# Patient Record
Sex: FEMALE | URBAN - METROPOLITAN AREA
[De-identification: names, ages, dates, MRNs, and addresses within clinical notes are randomized per-mention and may not be internally consistent; named-entity substitution may affect disease eponyms.]

---

## 2019-05-30 ENCOUNTER — HOSPITAL ENCOUNTER (OUTPATIENT)
Dept: RADIOLOGY | Facility: MEDICAL CENTER | Age: 44
End: 2019-05-30
Attending: OBSTETRICS & GYNECOLOGY
Payer: COMMERCIAL

## 2019-05-30 DIAGNOSIS — Z12.31 BREAST CANCER SCREENING BY MAMMOGRAM: ICD-10-CM

## 2019-05-30 PROCEDURE — 77063 BREAST TOMOSYNTHESIS BI: CPT

## 2020-02-24 ENCOUNTER — HOSPITAL ENCOUNTER (OUTPATIENT)
Dept: LAB | Facility: MEDICAL CENTER | Age: 45
End: 2020-02-24
Attending: OPHTHALMOLOGY
Payer: COMMERCIAL

## 2020-02-24 ENCOUNTER — HOSPITAL ENCOUNTER (OUTPATIENT)
Dept: RADIOLOGY | Facility: MEDICAL CENTER | Age: 45
End: 2020-02-24
Attending: OPHTHALMOLOGY
Payer: COMMERCIAL

## 2020-02-24 DIAGNOSIS — H30.91 RIGHT POSTERIOR UVEITIS: ICD-10-CM

## 2020-02-24 LAB
ALBUMIN SERPL BCP-MCNC: 4.8 G/DL (ref 3.2–4.9)
ALBUMIN/GLOB SERPL: 1.3 G/DL
ALP SERPL-CCNC: 56 U/L (ref 30–99)
ALT SERPL-CCNC: 11 U/L (ref 2–50)
ANION GAP SERPL CALC-SCNC: 10 MMOL/L (ref 0–11.9)
AST SERPL-CCNC: 19 U/L (ref 12–45)
BASOPHILS # BLD AUTO: 0.4 % (ref 0–1.8)
BASOPHILS # BLD: 0.03 K/UL (ref 0–0.12)
BILIRUB SERPL-MCNC: 0.3 MG/DL (ref 0.1–1.5)
BUN SERPL-MCNC: 11 MG/DL (ref 8–22)
CALCIUM SERPL-MCNC: 10 MG/DL (ref 8.5–10.5)
CHLORIDE SERPL-SCNC: 102 MMOL/L (ref 96–112)
CO2 SERPL-SCNC: 26 MMOL/L (ref 20–33)
CREAT SERPL-MCNC: 0.9 MG/DL (ref 0.5–1.4)
EOSINOPHIL # BLD AUTO: 0.07 K/UL (ref 0–0.51)
EOSINOPHIL NFR BLD: 0.9 % (ref 0–6.9)
ERYTHROCYTE [DISTWIDTH] IN BLOOD BY AUTOMATED COUNT: 39.1 FL (ref 35.9–50)
GLOBULIN SER CALC-MCNC: 3.6 G/DL (ref 1.9–3.5)
GLUCOSE SERPL-MCNC: 108 MG/DL (ref 65–99)
HCT VFR BLD AUTO: 41.7 % (ref 37–47)
HGB BLD-MCNC: 13.6 G/DL (ref 12–16)
IMM GRANULOCYTES # BLD AUTO: 0.02 K/UL (ref 0–0.11)
IMM GRANULOCYTES NFR BLD AUTO: 0.3 % (ref 0–0.9)
LYMPHOCYTES # BLD AUTO: 1.38 K/UL (ref 1–4.8)
LYMPHOCYTES NFR BLD: 18.6 % (ref 22–41)
MCH RBC QN AUTO: 29.5 PG (ref 27–33)
MCHC RBC AUTO-ENTMCNC: 32.6 G/DL (ref 33.6–35)
MCV RBC AUTO: 90.5 FL (ref 81.4–97.8)
MONOCYTES # BLD AUTO: 0.61 K/UL (ref 0–0.85)
MONOCYTES NFR BLD AUTO: 8.2 % (ref 0–13.4)
NEUTROPHILS # BLD AUTO: 5.29 K/UL (ref 2–7.15)
NEUTROPHILS NFR BLD: 71.6 % (ref 44–72)
NRBC # BLD AUTO: 0 K/UL
NRBC BLD-RTO: 0 /100 WBC
PLATELET # BLD AUTO: 284 K/UL (ref 164–446)
PMV BLD AUTO: 9.9 FL (ref 9–12.9)
POTASSIUM SERPL-SCNC: 3.5 MMOL/L (ref 3.6–5.5)
PROT SERPL-MCNC: 8.4 G/DL (ref 6–8.2)
RBC # BLD AUTO: 4.61 M/UL (ref 4.2–5.4)
SODIUM SERPL-SCNC: 138 MMOL/L (ref 135–145)
WBC # BLD AUTO: 7.4 K/UL (ref 4.8–10.8)

## 2020-02-24 PROCEDURE — 71046 X-RAY EXAM CHEST 2 VIEWS: CPT

## 2020-02-24 PROCEDURE — 80053 COMPREHEN METABOLIC PANEL: CPT

## 2020-02-24 PROCEDURE — 86777 TOXOPLASMA ANTIBODY: CPT

## 2020-02-24 PROCEDURE — 36415 COLL VENOUS BLD VENIPUNCTURE: CPT

## 2020-02-24 PROCEDURE — 86480 TB TEST CELL IMMUN MEASURE: CPT

## 2020-02-24 PROCEDURE — 85025 COMPLETE CBC W/AUTO DIFF WBC: CPT

## 2020-02-24 PROCEDURE — 86780 TREPONEMA PALLIDUM: CPT

## 2020-02-24 PROCEDURE — 85549 MURAMIDASE: CPT

## 2020-02-24 PROCEDURE — 82164 ANGIOTENSIN I ENZYME TEST: CPT

## 2020-02-25 LAB — TREPONEMA PALLIDUM IGG+IGM AB [PRESENCE] IN SERUM OR PLASMA BY IMMUNOASSAY: NON REACTIVE

## 2020-02-26 LAB
ACE SERPL-CCNC: 64 U/L (ref 9–67)
GAMMA INTERFERON BACKGROUND BLD IA-ACNC: 0.09 IU/ML
LYSOZYME SERPL-MCNC: 1.33 UG/ML
M TB IFN-G BLD-IMP: NEGATIVE
M TB IFN-G CD4+ BCKGRND COR BLD-ACNC: 0.01 IU/ML
MITOGEN IGNF BCKGRD COR BLD-ACNC: >10 IU/ML
QFT TB2 - NIL TBQ2: 0.01 IU/ML
T GONDII IGG SER-ACNC: <3 IU/ML

## 2020-03-09 ENCOUNTER — OFFICE VISIT (OUTPATIENT)
Dept: MEDICAL GROUP | Facility: MEDICAL CENTER | Age: 45
End: 2020-03-09
Payer: COMMERCIAL

## 2020-03-09 VITALS
WEIGHT: 156 LBS | SYSTOLIC BLOOD PRESSURE: 106 MMHG | HEIGHT: 67 IN | RESPIRATION RATE: 16 BRPM | OXYGEN SATURATION: 100 % | TEMPERATURE: 98.3 F | BODY MASS INDEX: 24.48 KG/M2 | HEART RATE: 64 BPM | DIASTOLIC BLOOD PRESSURE: 68 MMHG

## 2020-03-09 DIAGNOSIS — R59.0 HILAR ADENOPATHY: ICD-10-CM

## 2020-03-09 DIAGNOSIS — H30.93 POSTERIOR UVEITIS, BILATERAL: ICD-10-CM

## 2020-03-09 DIAGNOSIS — Z13.6 SCREENING FOR CARDIOVASCULAR CONDITION: ICD-10-CM

## 2020-03-09 DIAGNOSIS — R77.8 ELEVATED TOTAL PROTEIN: ICD-10-CM

## 2020-03-09 PROBLEM — Z97.5 IUD (INTRAUTERINE DEVICE) IN PLACE: Status: ACTIVE | Noted: 2020-03-09

## 2020-03-09 PROCEDURE — 99203 OFFICE O/P NEW LOW 30 MIN: CPT | Performed by: FAMILY MEDICINE

## 2020-03-09 RX ORDER — CYCLOPENTOLATE HYDROCHLORIDE 10 MG/ML
SOLUTION/ DROPS OPHTHALMIC
COMMUNITY
Start: 2020-02-24

## 2020-03-09 RX ORDER — PREDNISOLONE ACETATE 10 MG/ML
SUSPENSION/ DROPS OPHTHALMIC
COMMUNITY
Start: 2020-02-22

## 2020-03-09 SDOH — HEALTH STABILITY: MENTAL HEALTH: HOW OFTEN DO YOU HAVE A DRINK CONTAINING ALCOHOL?: NEVER

## 2020-03-09 ASSESSMENT — FIBROSIS 4 INDEX: FIB4 SCORE: 0.89

## 2020-03-09 NOTE — PROGRESS NOTES
"cc:  adenopathy    Subjective:     Vesta Smith is a 44 y.o. female presenting with her  to [facility] care.  Several weeks ago, she developed floaters in her right eye.  She saw ophthalmology, who diagnosed her with posterior uveitis bilaterally.  She is currently on 2 eyedrops.  She had a chest x-ray that showed bilateral hilar adenopathy.  QuantiFERON gold, toxo, ACE, syphilis, CBC were normal.  CMP was notable only for mildly elevated total protein and globulin level.  She reports that her vision has stabilized.  Denies any other symptoms.  Denies any fevers, cough, chest pain, shortness of breath, lightheadedness, dizziness, skin lesions weight changes.  Is a non-smoker.  She eats healthy, is vegetarian.  Exercises regularly.  They do travel a fair amount.  She is originally from [place].  She reports that her mom did have cutaneous tuberculosis.      Review of systems:  See above.       Current Outpatient Medications:   •  cyclopentolate (CYCLOGYL) 1 % Solution, INSTILL 1 DROP INTO RIGHT EYE 4 TIMES DAILY FOR 2 DAYS AND THEN EVERY 12 HOURS FOR 2 WEEKS, Disp: , Rfl:   •  prednisoLONE acetate (PRED FORTE) 1 % Suspension, , Disp: , Rfl:     Allergies, past medical history, past surgical history, family history, social history reviewed and updated    Objective:     Vitals: /68   Pulse 64   Temp 36.8 °C (98.3 °F)   Resp 16   Ht 1.702 m (5' 7\")   Wt 70.8 kg (156 lb)   SpO2 100%   BMI 24.43 kg/m²   General: Alert, pleasant, NAD  HEENT: Normocephalic.  EOMI, no icterus or pallor.  Conjunctivae and lids normal. External ears normal. Oropharynx non-erythematous, mucous membranes moist.  Neck supple.  No thyromegaly or masses palpated. No cervical or supraclavicular lymphadenopathy.  Heart: Regular rate and rhythm.  S1 and S2 normal.  No murmurs appreciated.  Respiratory: Normal respiratory effort.  Clear to auscultation bilaterally.  Abdomen: Non-distended, soft  Skin: Warm, dry, no " rashes.  Musculoskeletal: Gait is normal.  Moves all extremities well.  Extremities: No leg edema.  Psych:  Affect/mood is normal, judgement is good, memory is intact, grooming is appropriate.    Assessment/Plan:     Vesta was seen today for establish care.    Diagnoses and all orders for this visit:    Hilar adenopathy  Posterior uveitis, bilateral  New problems, discussed possible sarcoidosis.  Will check a CT of the chest, also check a TSH and SPEP.  They have a friend who is a pulmonologist who they plan to consult.  They declined a referral for now, but will let us know if it referral needs to be placed.  -     CT-CHEST (THORAX) WITH; Future  -     SPEP W/REFLEX TO TANO, A, G, M; Future  -     TSH WITH REFLEX TO FT4; Future    Elevated total protein  New problem, we reviewed her labs, chest x-ray.  We will check the following  -     CT-CHEST (THORAX) WITH; Future  -     SPEP W/REFLEX TO TANO, A, G, M; Future  -     TSH WITH REFLEX TO FT4; Future        Return in about 1 year (around 3/9/2021) for Preventive/annual physical.

## 2020-03-21 ENCOUNTER — APPOINTMENT (OUTPATIENT)
Dept: RADIOLOGY | Facility: MEDICAL CENTER | Age: 45
End: 2020-03-21
Attending: NURSE PRACTITIONER
Payer: COMMERCIAL

## 2020-03-21 ENCOUNTER — HOSPITAL ENCOUNTER (OUTPATIENT)
Dept: LAB | Facility: MEDICAL CENTER | Age: 45
End: 2020-03-21
Attending: FAMILY MEDICINE
Payer: COMMERCIAL

## 2020-03-21 ENCOUNTER — HOSPITAL ENCOUNTER (OUTPATIENT)
Dept: LAB | Facility: MEDICAL CENTER | Age: 45
End: 2020-03-21
Payer: COMMERCIAL

## 2020-03-21 DIAGNOSIS — R59.0 LYMPHADENOPATHY, THORACIC: ICD-10-CM

## 2020-03-21 DIAGNOSIS — R77.8 ELEVATED TOTAL PROTEIN: ICD-10-CM

## 2020-03-21 DIAGNOSIS — R59.0 HILAR ADENOPATHY: ICD-10-CM

## 2020-03-21 LAB
BDY FAT % MEASURED: 30.6 %
BP DIAS: 55 MMHG
BP SYS: 102 MMHG
CHOLEST SERPL-MCNC: 177 MG/DL (ref 100–199)
DIABETES HTDIA: NO
EVENT NAME HTEVT: NORMAL
FASTING HTFAS: YES
GLUCOSE SERPL-MCNC: 89 MG/DL (ref 65–99)
HDLC SERPL-MCNC: 61 MG/DL
HYPERTENSION HTHYP: NO
LDLC SERPL CALC-MCNC: 102 MG/DL
SCREENING LOC CITY HTCIT: NORMAL
SCREENING LOC STATE HTSTA: NORMAL
SCREENING LOCATION HTLOC: NORMAL
SMOKING HTSMO: NO
SUBSCRIBER ID HTSID: NORMAL
TRIGL SERPL-MCNC: 70 MG/DL (ref 0–149)
TSH SERPL DL<=0.005 MIU/L-ACNC: 1.71 UIU/ML (ref 0.38–5.33)

## 2020-03-21 PROCEDURE — 84155 ASSAY OF PROTEIN SERUM: CPT

## 2020-03-21 PROCEDURE — 82947 ASSAY GLUCOSE BLOOD QUANT: CPT

## 2020-03-21 PROCEDURE — 84165 PROTEIN E-PHORESIS SERUM: CPT

## 2020-03-21 PROCEDURE — 700117 HCHG RX CONTRAST REV CODE 255: Performed by: NURSE PRACTITIONER

## 2020-03-21 PROCEDURE — 71260 CT THORAX DX C+: CPT

## 2020-03-21 PROCEDURE — 84443 ASSAY THYROID STIM HORMONE: CPT

## 2020-03-21 PROCEDURE — 80061 LIPID PANEL: CPT

## 2020-03-21 PROCEDURE — 36415 COLL VENOUS BLD VENIPUNCTURE: CPT

## 2020-03-21 PROCEDURE — S5190 WELLNESS ASSESSMENT BY NONPH: HCPCS

## 2020-03-21 RX ADMIN — IOHEXOL 75 ML: 350 INJECTION, SOLUTION INTRAVENOUS at 12:30

## 2020-03-24 LAB
ALBUMIN SERPL ELPH-MCNC: 4.04 G/DL (ref 3.75–5.01)
ALPHA1 GLOB SERPL ELPH-MCNC: 0.23 G/DL (ref 0.19–0.46)
ALPHA2 GLOB SERPL ELPH-MCNC: 0.59 G/DL (ref 0.48–1.05)
B-GLOBULIN SERPL ELPH-MCNC: 0.83 G/DL (ref 0.48–1.1)
GAMMA GLOB SERPL ELPH-MCNC: 1.31 G/DL (ref 0.62–1.51)
INTERPRETATION SERPL IFE-IMP: NORMAL
MONOCLON BAND OBS SERPL: NORMAL
PATHOLOGY STUDY: NORMAL
PROT SERPL-MCNC: 7 G/DL (ref 6.3–8.2)

## 2020-07-13 ENCOUNTER — PATIENT MESSAGE (OUTPATIENT)
Dept: MEDICAL GROUP | Facility: MEDICAL CENTER | Age: 45
End: 2020-07-13

## 2020-07-13 DIAGNOSIS — Z97.5 PRESENCE OF INTRAUTERINE CONTRACEPTIVE DEVICE: ICD-10-CM

## 2020-08-28 ENCOUNTER — PATIENT MESSAGE (OUTPATIENT)
Dept: MEDICAL GROUP | Facility: MEDICAL CENTER | Age: 45
End: 2020-08-28

## 2020-08-28 DIAGNOSIS — R59.0 HILAR ADENOPATHY: ICD-10-CM

## 2020-08-29 NOTE — TELEPHONE ENCOUNTER
From: Vesta Smith  To: Carrillo Giles M.D.  Sent: 8/28/2020 2:56 PM PDT  Subject: Test Result Question    Hello Doctor,  How are you?   Can you please order a chest X_ray for me. The pulmonologist suggested one after 6 months as a follow up.  Thanks   Vesta

## 2023-09-27 ENCOUNTER — DOCUMENTATION (OUTPATIENT)
Dept: HEALTH INFORMATION MANAGEMENT | Facility: OTHER | Age: 48
End: 2023-09-27

## 2023-09-28 NOTE — PROGRESS NOTES
Pt sent the message below via [employer]:    I no longer have [facility] insurance, will not be able to see any doctor in [place] for now.